# Patient Record
Sex: MALE | Race: WHITE | NOT HISPANIC OR LATINO | Employment: FULL TIME
[De-identification: names, ages, dates, MRNs, and addresses within clinical notes are randomized per-mention and may not be internally consistent; named-entity substitution may affect disease eponyms.]

---

## 2024-02-08 ENCOUNTER — TRANSCRIBE ORDERS (OUTPATIENT)
Dept: OTHER | Age: 64
End: 2024-02-08

## 2024-02-08 DIAGNOSIS — D03.9 MELANOMA IN SITU (H): Primary | ICD-10-CM

## 2024-02-12 ENCOUNTER — TELEPHONE (OUTPATIENT)
Dept: DERMATOLOGY | Facility: CLINIC | Age: 64
End: 2024-02-12
Payer: COMMERCIAL

## 2024-02-12 NOTE — TELEPHONE ENCOUNTER
Attempted to reach patient to schedule a Mohs consult. Left message for patient to return call to clinic to schedule and [email or web address].

## 2024-02-12 NOTE — TELEPHONE ENCOUNTER
----- Message from Tiffany Bailey sent at 2/9/2024  8:28 AM CST -----  Good morning,     This patient has a new referral for Skin, Right Lateral Canthus - MELANOMA IN SITU.     There are no photos and pt doesn't have MyChart. He may be able to set up MyChart and submit photos for a phone visit, but I haven't spoken with him yet.     Is there a time in the near future that we could add Gael on for a consultation in [place]? Let me know what you think.    Thanks,     Tiffany Bailey, Procedure  2/9/2024 8:31 AM

## 2024-02-13 NOTE — TELEPHONE ENCOUNTER
Excision/Mohs previsit information                                                    Diagnosis: melanoma in-situ  Site(s): R lateral canthus     Over the counter Chlorhexidine surgical soap to wash all skin below the belly button twice before surgery should be recommended for the following:  - Surgical sites below the waist  - Immunosuppressed  - Previous surgical site infection  - Anticipated wound care challenges    Medication & Allergy Information                                                      Review and update allergy and medication list.    Do you take the following medications:  Coumadin, Eliquis, Pradaxa, Xarelto:  NO   -If on Coumadin, INR should be checked within 7 days of surgery.  Range should be 3.5 or less or within therapeutic range.    Past Medical History                                                    Do you currently or have you previously had any of the following conditions:    Hepatitis:  NO  HIV/AIDS:  NO  Prolonged bleeding or bleeding disorder:  NO  Pacemaker or Defibrillator:  NO.    History of artificial or heart valve replacement:  NO  Endocarditis (inflammation of the inner lining of the heart's chambers and valves):  NO  Have you ever had a prosthetic joint infection:  NO  Pregnant or Breastfeeding:  N/A  Mobility device (wheelchair, transfer difficulty): NO    Important Reminders:                                                      Ok to take all of their medications as prescribed  Patients can eat, no need to be fasting  Patient will not be able to get the site wet for 48 hrs  No submerging wound in standing water (lake, pool, bathtub, hot tub) for 2 weeks  No physical activity for 48 hrs (further restrictions will be discussed by MD at time of visit)    If any positives, send to RN for further review  Ashia Rivas RN

## 2024-02-13 NOTE — TELEPHONE ENCOUNTER
Fax confirmation received but the records have not come through. I spoke to Gael and he will email us a photo.     Fátima NGUYỄN

## 2024-02-14 ENCOUNTER — VIRTUAL VISIT (OUTPATIENT)
Dept: DERMATOLOGY | Facility: CLINIC | Age: 64
End: 2024-02-14
Payer: COMMERCIAL

## 2024-02-14 DIAGNOSIS — D03.112: Primary | ICD-10-CM

## 2024-02-14 PROCEDURE — 99213 OFFICE O/P EST LOW 20 MIN: CPT | Performed by: DERMATOLOGY

## 2024-02-14 NOTE — NURSING NOTE
Gael Allen's goals for this visit include:   Chief Complaint   Patient presents with    Consult     Mohs consult for MIS right lateral canthus       He requests these members of his care team be copied on today's visit information:     PCP: Jong Alfaro    Referring Provider:  No referring provider defined for this encounter.    There were no vitals taken for this visit.    Do you need any medication refills at today's visit?       Carlie Lawson EMT

## 2024-02-14 NOTE — PROGRESS NOTES
[facility] Dermatology Note  Encounter Date: Feb 14, 2024  Store-and-Forward and Telephone. Location of teledermatologist: [facility].  Start time: 1132. End time: 1148.    Dermatologic Surgery Telemedicine Consult Note    Dermatology Problem List:  Last FBSE 1/29/24 with [facility] provider Dr. Jose Luis Randhawa    1. H/O melanoma  - MIS - right lateral canthus, s/p bx 1/29/24, MMS scheduling pending as of 2/14/24  2. Atypical nevi  - MONITOR: left lower back, left upper back, mid back, right lower back, right upper back; photodocumentation in [facility] records  3. Prurigo nodularis - posterior neck  - Tx: s/p cryo and ILK  - Future Considerations: Dupixent, shave removal  4. AK  - left forehead, s/p cryo 1/29/24  5. H/O benign biopsies  - neurofibroma - left lower back x 1, left flank x 1, mid back x 1, right flank x 1, s/p bx 1/26/23  - impetiginized prurigo nodule 0  mid occipital scalp, s/p bx 6/16/22  6. Digital mucous cyst - 3rd digit DIP right hand  - s/p I&D 6/16/22; referral to hand surgery  7. Facial dermatitis  - Current Tx: ketoconazole cream, pimecrolimus  - Prior Tx: hydrocortisone     PMHx: Inflammatory arthritis.  FHx: Negative for skin cancers or skin conditions.  SHx[place]  ___________________________________________________________    CC: Consult (Mohs consult for MIS right lateral canthus)      Subjective: Gael Allen is a 63 year old male who presents today for Mohs micrographic surgery consultation for a recent diagnosis of skin cancer.  - Skin cancer(s): Melanoma in situ  - Location(s): right lateral canthus  - Patient reports astigmatism of left eye which has not been treatable. Therefore, he is concerned with surgery affecting his the vision of his right eye so that he would be unable to function.   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   - He is scheduled to go on a  "weekend goose hunting trip in 3/2024 and wants to be able to participate in that trip without hindrance. He would like to schedule procedure for after this trip.  - No other concerns today.      Objective:   Skin: Focused examination of the right eye within the teledermatology photograph(s) on 2/14/24 was performed.   - pink atrophic macule on the right lower cutaneous eyelid ~5 mm with faint tan macule adjacent    Path report:   Last derm path 1/29/24  FINAL DIAGNOSIS:  A.  Skin, Right Lateral Canthus, shave:  - MELANOMA IN SITU, extending to the peripheral edges of the specimen  Gross Description:  A: Received in formalin, labeled with the patient's name and \"Skin, Right Lateral Canthus\" is a 5 x 3 x 1 mm shave biopsy of skin.  The specimen is marked with blue ink and submitted entirely in one cassette.  TV    Assessment and Plan:   1. Plan for Mohs micrographic surgery for skin cancer above:  *Review lab result: Dermpath report   - We discussed the nature of the diagnosis/condition above. We discussed the treatment options, including the risks benefits and expectations of these options. We recommend micrographic surgery as the most effective and most tissue sparing option for treatment, and the patient agrees to proceed with this. The patient is aware of the risks, benefits and expectations of this procedure. Assured the patient that his vision should not be affected by the procedure. Provided patient with option to coordinate procedure with eyelid surgeon for repair to be performed the day after micrographic surgery. Patient declined this option due to his schedule. The patient will be scheduled for this procedure, if not already done so. Patient would like to schedule surgery after unavoidable commitment. We discussed that the sooner this is treated, the better. However, melanoma in situ is typically slow growing, therefore surgical efficacy and scarring is likely to be prevalent before or after his commitment. " Ideally scheduled within 6 weeks of biopsy (approximately on 3/11/24).  - We anticipate the following closure type: Sliding or lifting flap    The patient was discussed with and evaluated by attending physician, Ben Lewis DO.    Scribe Disclosure:   I, Fifi Porter, am serving as a scribe to document services personally performed by Ben Lewis MD based on data collection and the provider's statements to me.     Provider Disclosure:   The documentation recorded by the scribe accurately reflects the services I personally performed and the decisions made by me.    Ben Lewis DO    Department of Dermatology  [facility]: Phone: [phone], Fax:[phone]  [facility]: Phone: [phone], Fax: [phone]

## 2024-02-14 NOTE — LETTER
2/14/2024         RE: Gael Allen  [address] [place] [id number]        Dear Colleague,    Thank you for referring your patient, Gael Allen, to the [facility]. Please see a copy of my visit note below.              [facility] Dermatology Note  Encounter Date: Feb 14, 2024  Store-and-Forward and Telephone. Location of teledermatologist: [facility].  Start time: 1132. End time: 1148.    Dermatologic Surgery Telemedicine Consult Note    Dermatology Problem List:  Last FBSE 1/29/24 with [employer] provider Dr. Jose Luis Randhawa    1. H/O melanoma  - MIS - right lateral canthus, s/p bx 1/29/24, MMS scheduling pending as of 2/14/24  2. Atypical nevi  - MONITOR: left lower back, left upper back, mid back, right lower back, right upper back; photodocumentation in [employer] records  3. Prurigo nodularis - posterior neck  - Tx: s/p cryo and ILK  - Future Considerations: Dupixent, shave removal  4. AK  - left forehead, s/p cryo 1/29/24  5. H/O benign biopsies  - neurofibroma - left lower back x 1, left flank x 1, mid back x 1, right flank x 1, s/p bx 1/26/23  - impetiginized prurigo nodule 0  mid occipital scalp, s/p bx 6/16/22  6. Digital mucous cyst - 3rd digit DIP right hand  - s/p I&D 6/16/22; referral to hand surgery  7. Facial dermatitis  - Current Tx: ketoconazole cream, pimecrolimus  - Prior Tx: hydrocortisone     PMHx: Inflammatory arthritis.  FHx: Negative for skin cancers or skin conditions.  SHx: [place].  ___________________________________________________________    CC: Consult (Mohs consult for MIS right lateral canthus)      Subjective: Gael Allen is a 63 year old male who presents today for Mohs micrographic surgery consultation for a recent diagnosis of skin cancer.  - Skin cancer(s): Melanoma in situ  - Location(s): right lateral canthus  - Patient reports astigmatism of left eye which  "has not been treatable. Therefore, he is concerned with surgery affecting his the vision of his right eye so that he would be unable to function.   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   - He is scheduled to go on a weekend goose hunting trip in 3/2024 and wants to be able to participate in that trip without hindrance. He would like to schedule procedure for after this trip.  - No other concerns today.      Objective:   Skin: Focused examination of the right eye within the teledermatology photograph(s) on 2/14/24 was performed.   - pink atrophic macule on the right lower cutaneous eyelid ~5 mm with faint tan macule adjacent    Path report:   Last derm path 1/29/24  FINAL DIAGNOSIS:  A.  Skin, Right Lateral Canthus, shave:  - MELANOMA IN SITU, extending to the peripheral edges of the specimen  Gross Description:  A: Received in formalin, labeled with the patient's name and \"Skin, Right Lateral Canthus\" is a 5 x 3 x 1 mm shave biopsy of skin.  The specimen is marked with blue ink and submitted entirely in one cassette.  TV    Assessment and Plan:   1. Plan for Mohs micrographic surgery for skin cancer above:  *Review lab result: Dermpath report   - We discussed the nature of the diagnosis/condition above. We discussed the treatment options, including the risks benefits and expectations of these options. We recommend micrographic surgery as the most effective and most tissue sparing option for treatment, and the patient agrees to proceed with this. The patient is aware of the risks, benefits and expectations of this procedure. Assured the patient that his vision should not be affected by the procedure. Provided patient with option to coordinate procedure with eyelid surgeon for repair to be performed the day after micrographic surgery. Patient declined this option due to his schedule. The patient will be scheduled for this procedure, if not already done so. Patient would like " to schedule surgery after unavoidable commitment. We discussed that the sooner this is treated, the better. However, melanoma in situ is typically slow growing, therefore surgical efficacy and scarring is likely to be prevalent before or after his commitment. Ideally scheduled within 6 weeks of biopsy (approximately on 3/11/24).  - We anticipate the following closure type: Sliding or lifting flap    The patient was discussed with and evaluated by attending physician, Ben Lewis DO.    Scribe Disclosure:   I, Fifi Porter, am serving as a scribe to document services personally performed by Ben Lewis MD based on data collection and the provider's statements to me.     Provider Disclosure:   The documentation recorded by the scribe accurately reflects the services I personally performed and the decisions made by me.    Ben Lewis DO    Department of Dermatology  [facility]: Phone: [phone], Fax:[phone]  [facility]: Phone: [phone], Fax: [phone]         Again, thank you for allowing me to participate in the care of your patient.        Sincerely,        Ben Lewis MD

## 2024-02-15 ENCOUNTER — TELEPHONE (OUTPATIENT)
Dept: DERMATOLOGY | Facility: CLINIC | Age: 64
End: 2024-02-15
Payer: COMMERCIAL

## 2024-02-15 NOTE — TELEPHONE ENCOUNTER
Called patient to schedule surgery with Dr. Lewis    Date of Surgery: 03/20    Surgery type: mohs    Consult scheduled: Yes    Has patient had mohs with us before? No    Additional comments: pt has a hunting trip in March and needs to wait until after he returns. Pt informed Dr. Lewis of this in the phone visit.       Tiffany Ortizgfried on 2/15/2024 at 12:26 PM

## 2024-03-20 ENCOUNTER — OFFICE VISIT (OUTPATIENT)
Dept: DERMATOLOGY | Facility: CLINIC | Age: 64
End: 2024-03-20
Payer: COMMERCIAL

## 2024-03-20 ENCOUNTER — TELEPHONE (OUTPATIENT)
Dept: DERMATOLOGY | Facility: CLINIC | Age: 64
End: 2024-03-20

## 2024-03-20 VITALS — OXYGEN SATURATION: 98 % | DIASTOLIC BLOOD PRESSURE: 88 MMHG | HEART RATE: 65 BPM | SYSTOLIC BLOOD PRESSURE: 143 MMHG

## 2024-03-20 DIAGNOSIS — D03.112: ICD-10-CM

## 2024-03-20 PROCEDURE — 17311 MOHS 1 STAGE H/N/HF/G: CPT | Mod: GC | Performed by: DERMATOLOGY

## 2024-03-20 PROCEDURE — 88314 HISTOCHEMICAL STAINS ADD-ON: CPT | Mod: 59 | Performed by: DERMATOLOGY

## 2024-03-20 PROCEDURE — 14061 TIS TRNFR E/N/E/L10.1-30SQCM: CPT | Mod: GC | Performed by: DERMATOLOGY

## 2024-03-20 PROCEDURE — 88305 TISSUE EXAM BY PATHOLOGIST: CPT | Mod: XE | Performed by: PATHOLOGY

## 2024-03-20 ASSESSMENT — PAIN SCALES - GENERAL: PAINLEVEL: NO PAIN (0)

## 2024-03-20 NOTE — PATIENT INSTRUCTIONS
Caring for your skin after surgery    After your surgery, a pressure bandage will be placed over the area. This will prevent bleeding. Please follow these instructions over the next 1 to 2 weeks. Following this regimen will help to prevent complications as your wound heals.     For the first 48 hours after your surgery:    Leave the pressure dressing on and keep it dry. If it should come loose, you may re-tape it, but do not take it off.  Relax and take it easy. Do not do any vigorous exercise, heavy lifting or bending forward. This could cause the wound to bleed.  Post-operative pain is usually mild. You may alternate between 1000 mg of Tylenol (acetaminophen) and 400 mg of Ibuprofen every 4 hours.  Do not take more than 4,000 mg of acetaminophen in a 24-hour period or 3200 mg of Ibuprofen in a 24-hr period.  Avoid alcohol and vitamin E as these may increase your tendency to bleed.  You may put an ice pack around the bandaged area for 20 minutes at a time as needed. This may help reduce swelling, bruising, and pain. Make sure the ice pack is waterproof so that the pressure bandage doesn't get wet.  You may see a small amount of drainage or blood on your pressure bandage. This is normal. However:  If drainage or bleeding continues or saturates the bandage, you will need to apply firm pressure over the bandage with a clean washcloth for 15 minutes.  If bleeding continues after applying pressure for 15 minutes, apply an ice pack with gentle pressure to the bandaged area for another 15 minutes.  If bleeding still continues, call our office or go to the nearest emergency room.    48 Hours After Surgery:  Carefully remove the pressure bandage. If it seems sticky or too difficult to get off, you may need to soak it off in the shower.  Wash wound with a mild soap and water.  Use caution when washing the wound, be gentle and do not let the forceful shower stream hit the wound directly.  Pat dry.  Apply Vaseline (from a new  container or tube) over the suture line with a Q-tip.  Cover the site with a bandage.  Do this daily until the sutures have dissolved.      What to expect:    The first couple of days your wound may be tender and may bleed slightly when doing wound care.  There may be swelling and bruising around the wound, especially if it is near the eyes. For your comfort, you may apply ice or cold compresses to the area.  The area around your wound may be numb for several weeks or even months.  You may experience periodic sharp pain or mild itching around the wound as it heals.   The suture line will look dark pink at first and the edges of the wound will be reddened. This will lighten up each day.    Call Us If:    You have bleeding that will not stop after applying pressure and ice.  You have pain that is not controlled with Tylenol and Ibuprofen.  You have signs or symptoms of an infection such as fever over 100 degrees Fahrenheit, redness, warmth or foul-smelling drainage from the wound    [facility]: [phone]   [facility]: [phone]  For urgent needs outside of business hours call the [facility] at [phone] and ask to speak with the dermatology resident on call

## 2024-03-20 NOTE — TELEPHONE ENCOUNTER
[facility] Call Center    Phone Message    May a detailed message be left on voicemail: yes     Reason for Call: Appointment Intake    Referring Provider Name: Dr. Lewis  Diagnosis and/or Symptoms: Reschedule the 4/3/24 Follow-up visit.     Action Taken: Message routed to:  Adult Clinics: Dermatology p [phone]    Travel Screening: Not Applicable

## 2024-03-20 NOTE — PROGRESS NOTES
Gael Allen's chief complaint for this visit includes:  Chief Complaint   Patient presents with    Mohs     MIS right lateral canthus     PCP: Jong Alfaro    Referring Provider:  Josh Randhawa MD  [facility] [place] [address]    BP (!) 143/88   Pulse 65   SpO2 98%   No Pain (0)        Allergies   Allergen Reactions    Augmentin [Amoxicillin-Pot Clavulanate] Rash         Do you need any medication refills at today's visit? No    Oralia Spann, [facility]

## 2024-03-20 NOTE — LETTER
3/20/2024         RE: Gael Allen  [address]  [place] [address]        Dear Colleague,    Thank you for referring your patient, Gael Allen, to the [facility]. Please see a copy of my visit note below.    Gael Allen's chief complaint for this visit includes:  Chief Complaint   Patient presents with     Mohs     MIS right lateral canthus     PCP: Jong Alfaro    Referring Provider:  Josh Randhawa MD  [address] [place] [address]    BP (!) 143/88   Pulse 65   SpO2 98%   No Pain (0)        Allergies   Allergen Reactions     Augmentin [Amoxicillin-Pot Clavulanate] Rash         Do you need any medication refills at today's visit? No    Oralia Spann CMA          [facility] [place] Procedure Note    Dermatology Problem List:  [facility]     1. H/O melanoma  - MIS - right lateral canthus, s/p Mohs 3/20/24  2. Atypical nevi  - MONITOR: left lower back, left upper back, mid back, right lower back, right upper back; photodocumentation in [employer] records  3. Prurigo nodularis - posterior neck  - Tx: s/p cryo and ILK  - Future Considerations: Dupixent, shave removal  4. AK  - left forehead, s/p cryo 1/29/24  5. H/O benign biopsies  - neurofibroma - left lower back x 1, left flank x 1, mid back x 1, right flank x 1, s/p bx 1/26/23  - impetiginized prurigo nodule 0  mid occipital scalp, s/p bx 6/16/22  6. Digital mucous cyst - 3rd digit DIP right hand  - s/p I&D 6/16/22; referral to hand surgery  7. Facial dermatitis  - Current Tx: ketoconazole cream, pimecrolimus  - Prior Tx: hydrocortisone      PMHx: Inflammatory arthritis.  FHx: Negative for skin cancers or skin conditions.  SHx: [place].  ____________________________________________________    Date of Service:  Mar 20, 2024  Surgery: Mohs micrographic surgery    Case 1  Repair Type: Bilobed  transposition flap  Repair Size: 4.0x2.8 cm  Suture Material: Fast Absorbing Gut 5-0  Tumor Type: Melanoma  Location: R lateral canthus  Derm-Path Accession #: [id number]  PreOp Size: 0.5x0.5 cm  PostOp Size: 2.2x1.5 cm  Mohs Accession #: [id number]  Level of Defect: muscle    Procedure:    Stage I  We discussed the principles of treatment and most likely complications including scarring, bleeding, infection, swelling, pain, crusting, nerve damage, large wound,  incomplete excision, wound dehiscence,  nerve damage, recurrence, and a second procedure may be recommended to obtain the best cosmetic or functional result.    Informed consent was obtained and the patient underwent the procedure as follows:  The patient was placed supine on the operating table.  The cancer was identified, outlined with a marker, and verified by the patient.  The entire surgical field was prepped with Providone.  The surgical site was anesthetized using lidocaine with epinephrine.    The area of clinically apparent tumor was excised and sent for permanent sections to rule out invasive melanoma. The peripheral rim of tissue was then surgically excised using a #15 blade and was then transferred onto a specimen sheet maintaining the orientation of the specimen. Hemostasis was obtained using bipolar electrocoagulation. The wound site was then covered with a dressing while the tissue samples were processed for examination.    The excised tissue was transported to the Mohs histology laboratory maintaining the tissue orientation.  The tissue specimen was relaxed so that the entire surgical margin was in a a single horizontal plane for sectioning and inked for precise mapping.  A precise reference map was drawn to reflect the sectioning of the specimen, colored inking of the margins, and orientation on the patient. The tissue was processed using horizontal sectioning of the base and continuous peripheral margins. Vertical, bread loafed sections were  obtained from the central portion of the lesion, prior to being sent for permament sections.     The tissue sections were stained with Hematoxylin and Eosin (H&E), as well as Melanoma antigen recognized by T cells or Melan-A (MART-1) stain. The histopathologic sections were reviewed in conjunction with the reference map.     Total blocks: 2   Total slides:  4    Was the skin lesion clear at this stage?: Yes, the skin lesion was determined clear at periphery at this stage: There was a relatively normal periodicity and density of melanocytes along the dermal-epidermal junction noted at the peripheral margins, therefore Mohs surgery was complete.    Procedure for Bilobed Transposition Flap:    INDICATIONS:  The patient is status post Mohs micrographic surgery.  After consideration of the adjacent tissue type and reconstructive options, including healing by second intention, it was determined that a bilobed transposition flap offered the best chance for preservation of normal anatomic and functional relationships.  The patient was advised of the risks of bleeding, infection, wound dehiscence, pincushioning and discomfort, as well as scar formation.  Informed consent was obtained in writing.  The patient underwent the procedure as follows:    PROCEDURES:  The patient was taken to the operative suite and placed supine on the operating room table.  The wound was identified and infiltrated with Lidocaine 1% with epi 1:100,000;Bupivacaine 0.5%.  The defect was then cleansed and prepped with Providone and draped with sterile drapes.  Using a marker, a bilobed transposition flap repair was planned.  The wound edges were then debeveled and the wound was undermined bluntly in all directions. The bilobed transposition flap was incised sharply to the deep subcutaneous plane.  The flap was undermined from all surrounding tissue. Hemostasis was obtained with bipolar electrocoagulation. The tertiary defect was closed first.  The  first lobe was then transposed into (carried over) the primary defect.  The second lobe was then trimmed to fit and transposed into the defect created by the transposition of the first lobe. The defect and flap were secured using buried Monocryl 4-0 and Monocryl 5-0 dermal sutures.  The epidermis was then carefully approximated using Fast Absorbing Gut 5-0 simple running epidermal sutures throughout the length of the flap.  Redundant skin was excised by the triangulation technique, and closed in similar fashion.  The wound was cleansed with saline and ointment was applied.  A sterile pressure dressing was placed.  The patient left the operating suite in stable condition.  Wound care was reviewed verbally and in writing.      Suture removal: N/A (all dissolving sutures used)      F/U with dermatology surgery in 2 weeks.    Dr. Ben Lewis was present for the entire procedure and always immediately available.    Scribe Disclosure:   I, Adrienne Quinteros, am serving as a scribe; to document services personally performed by Dr. Ben Lewis - -based on data collection and the provider's statements to me.     Staff attestation:  The documentation recorded by the scribe accurately reflects the services I personally performed and the decisions I personally made. I have made edits where needed.    Staff Physician Comments:   I saw and evaluated the patient with the Physician Assistant (LORRAINE Stearns) and I agree with the assessment and plan and the above description of the procedure. I personally performed the key portions of the procedure and entire exam. I was immediately available in the clinic throughout the procedures.     Ben Lewis DO    Department of Dermatology  [facility]: Phone: [phone], Fax:[phone]  [facility]: Phone: [phone], Fax: [phone]    Care and Laboratory  Testing Performed at:  [facility]   Dermatology Clinic  [address] [place] [address]      Again, thank you for allowing me to participate in the care of your patient.        Sincerely,        Ben Lewis MD

## 2024-03-20 NOTE — PROGRESS NOTES
[facility] [place] Procedure Note    Dermatology Problem List:  Last FBSE 1/29/24 with [facility] provider Dr. Jose Luis Randhawa     1. H/O melanoma  - MIS - right lateral canthus, s/p Mohs 3/20/24  2. Atypical nevi  - MONITOR: left lower back, left upper back, mid back, right lower back, right upper back; photodocumentation in [facility] records  3. Prurigo nodularis - posterior neck  - Tx: s/p cryo and ILK  - Future Considerations: Dupixent, shave removal  4. AK  - left forehead, s/p cryo 1/29/24  5. H/O benign biopsies  - neurofibroma - left lower back x 1, left flank x 1, mid back x 1, right flank x 1, s/p bx 1/26/23  - impetiginized prurigo nodule 0  mid occipital scalp, s/p bx 6/16/22  6. Digital mucous cyst - 3rd digit DIP right hand  - s/p I&D 6/16/22; referral to hand surgery  7. Facial dermatitis  - Current Tx: ketoconazole cream, pimecrolimus  - Prior Tx: hydrocortisone      PMHx: Inflammatory arthritis.  FHx: Negative for skin cancers or skin conditions.  SHx: [place].  ____________________________________________________    Date of Service:  Mar 20, 2024  Surgery: Mohs micrographic surgery    Case 1  Repair Type: Bilobed transposition flap  Repair Size: 4.0x2.8 cm  Suture Material: Fast Absorbing Gut 5-0  Tumor Type: Melanoma  Location: R lateral canthus  Derm-Path Accession #: [id number]  PreOp Size: 0.5x0.5 cm  PostOp Size: 2.2x1.5 cm  Mohs Accession #: [id number]  Level of Defect: muscle    Procedure:    Stage I  We discussed the principles of treatment and most likely complications including scarring, bleeding, infection, swelling, pain, crusting, nerve damage, large wound,  incomplete excision, wound dehiscence,  nerve damage, recurrence, and a second procedure may be recommended to obtain the best cosmetic or functional result.    Informed consent was obtained and the patient underwent the procedure as follows:  The patient was placed  supine on the operating table.  The cancer was identified, outlined with a marker, and verified by the patient.  The entire surgical field was prepped with Providone.  The surgical site was anesthetized using lidocaine with epinephrine.    The area of clinically apparent tumor was excised and sent for permanent sections to rule out invasive melanoma. The peripheral rim of tissue was then surgically excised using a #15 blade and was then transferred onto a specimen sheet maintaining the orientation of the specimen. Hemostasis was obtained using bipolar electrocoagulation. The wound site was then covered with a dressing while the tissue samples were processed for examination.    The excised tissue was transported to the Mohs histology laboratory maintaining the tissue orientation.  The tissue specimen was relaxed so that the entire surgical margin was in a a single horizontal plane for sectioning and inked for precise mapping.  A precise reference map was drawn to reflect the sectioning of the specimen, colored inking of the margins, and orientation on the patient. The tissue was processed using horizontal sectioning of the base and continuous peripheral margins. Vertical, bread loafed sections were obtained from the central portion of the lesion, prior to being sent for permament sections.     The tissue sections were stained with Hematoxylin and Eosin (H&E), as well as Melanoma antigen recognized by T cells or Melan-A (MART-1) stain. The histopathologic sections were reviewed in conjunction with the reference map.     Total blocks: 2   Total slides:  4    Was the skin lesion clear at this stage?: Yes, the skin lesion was determined clear at periphery at this stage: There was a relatively normal periodicity and density of melanocytes along the dermal-epidermal junction noted at the peripheral margins, therefore Mohs surgery was complete.    Procedure for Bilobed Transposition Flap:    INDICATIONS:  The patient is  status post Mohs micrographic surgery.  After consideration of the adjacent tissue type and reconstructive options, including healing by second intention, it was determined that a bilobed transposition flap offered the best chance for preservation of normal anatomic and functional relationships.  The patient was advised of the risks of bleeding, infection, wound dehiscence, pincushioning and discomfort, as well as scar formation.  Informed consent was obtained in writing.  The patient underwent the procedure as follows:    PROCEDURES:  The patient was taken to the operative suite and placed supine on the operating room table.  The wound was identified and infiltrated with Lidocaine 1% with epi 1:100,000;Bupivacaine 0.5%.  The defect was then cleansed and prepped with Providone and draped with sterile drapes.  Using a marker, a bilobed transposition flap repair was planned.  The wound edges were then debeveled and the wound was undermined bluntly in all directions. The bilobed transposition flap was incised sharply to the deep subcutaneous plane.  The flap was undermined from all surrounding tissue. Hemostasis was obtained with bipolar electrocoagulation. The tertiary defect was closed first.  The first lobe was then transposed into (carried over) the primary defect.  The second lobe was then trimmed to fit and transposed into the defect created by the transposition of the first lobe. The defect and flap were secured using buried Monocryl 4-0 and Monocryl 5-0 dermal sutures.  The epidermis was then carefully approximated using Fast Absorbing Gut 5-0 simple running epidermal sutures throughout the length of the flap.  Redundant skin was excised by the triangulation technique, and closed in similar fashion.  The wound was cleansed with saline and ointment was applied.  A sterile pressure dressing was placed.  The patient left the operating suite in stable condition.  Wound care was reviewed verbally and in writing.       Suture removal: N/A (all dissolving sutures used)      F/U with dermatology surgery in 2 weeks.    Dr. Ben Lewis was present for the entire procedure and always immediately available.    Scribe Disclosure:   I, Adrienne Quinteros, am serving as a scribe; to document services personally performed by Dr. Ben Lewis - -based on data collection and the provider's statements to me.     Staff attestation:  The documentation recorded by the scribe accurately reflects the services I personally performed and the decisions I personally made. I have made edits where needed.    Staff Physician Comments:   I saw and evaluated the patient with the Physician Assistant (LORRAINE Stearns) and I agree with the assessment and plan and the above description of the procedure. I personally performed the key portions of the procedure and entire exam. I was immediately available in the clinic throughout the procedures.     Ben Lewis DO    Department of Dermatology  [facility]: Phone: [phone], Fax:[phone]  [facility]: Phone: [phone], Fax: [phone]    Care and Laboratory Testing Performed at:  [facility]   Dermatology Clinic  [address] [place] [address]

## 2024-03-22 LAB
PATH REPORT.COMMENTS IMP SPEC: NORMAL
PATH REPORT.COMMENTS IMP SPEC: NORMAL
PATH REPORT.FINAL DX SPEC: NORMAL
PATH REPORT.GROSS SPEC: NORMAL
PATH REPORT.MICROSCOPIC SPEC OTHER STN: NORMAL
PATH REPORT.RELEVANT HX SPEC: NORMAL

## 2024-04-04 ENCOUNTER — OFFICE VISIT (OUTPATIENT)
Dept: DERMATOLOGY | Facility: CLINIC | Age: 64
End: 2024-04-04
Payer: COMMERCIAL

## 2024-04-04 DIAGNOSIS — Z86.006 HISTORY OF MELANOMA IN SITU: ICD-10-CM

## 2024-04-04 DIAGNOSIS — Z51.89 VISIT FOR WOUND CHECK: ICD-10-CM

## 2024-04-04 DIAGNOSIS — L21.9 DERMATITIS, SEBORRHEIC: Primary | ICD-10-CM

## 2024-04-04 PROCEDURE — 99213 OFFICE O/P EST LOW 20 MIN: CPT | Mod: 24 | Performed by: DERMATOLOGY

## 2024-04-04 RX ORDER — DESONIDE 0.5 MG/G
CREAM TOPICAL 2 TIMES DAILY
Qty: 30 G | Refills: 0 | Status: SHIPPED | OUTPATIENT
Start: 2024-04-04

## 2024-04-04 RX ORDER — KETOCONAZOLE 20 MG/ML
SHAMPOO TOPICAL
Qty: 120 ML | Refills: 3 | Status: SHIPPED | OUTPATIENT
Start: 2024-04-04

## 2024-04-04 NOTE — LETTER
4/4/2024         RE: Gael Allen  [address] [facility] [address]        Dear Colleague,    Thank you for referring your patient, Gael Allen, to the [facility]. Please see a copy of my visit note below.    Dermatologic Surgery Post-Op Wound Check     CC: Wound Check (2 week s/p Mohs right lateral canthus. Reports tightness in corner of eye.)    Dermatology Problem List:  Last FBSE 1/29/24 with [facility] provider Dr. Jose Luis Randhawa     1. H/O melanoma  - MIS - right lateral canthus, s/p Mohs 3/20/24  2. Atypical nevi  - MONITOR: left lower back, left upper back, mid back, right lower back, right upper back; photodocumentation in [facility] records  3. Prurigo nodularis - posterior neck  - Tx: s/p cryo and ILK  - Future Considerations: Dupixent, shave removal  4. AK  - left forehead, s/p cryo 1/29/24  5. H/O benign biopsies  - neurofibroma - left lower back x 1, left flank x 1, mid back x 1, right flank x 1, s/p bx 1/26/23  - impetiginized prurigo nodule 0  mid occipital scalp, s/p bx 6/16/22  6. Digital mucous cyst - 3rd digit DIP right hand  - s/p I&D 6/16/22; referral to hand surgery  7. Facial dermatitis  - Current Tx: ketoconazole cream, pimecrolimus  - Prior Tx: hydrocortisone      PMHx: Inflammatory arthritis.  FHx: Negative for skin cancers or skin conditions.  SHx: [place].  ____________________________________________________    Subjective: Gael Allen is a 63 year old male who presents today for wound check after Mohs surgery for melanoma in situ of the right lateral canthus on March 20, 2024.  He denies changes in vision.  He has been applying petroleum jelly as directed.  He discontinued dressings a few days ago.     -He is currently having a flare of the above-mentioned facial dermatitis.  He reports using some lotion at home.  He thinks the rash may be related to materials he is exposed to at work.     - no other concerns  today    Objective: An exam of the face was performed today   - The surgical site noted above is clean, dry, and intact. There is no surrounding erythema, purulence, or significant tenderness to palpation. No clinical evidence of infection noted today.  There is a moderate amount of pincushioning versus edema in the flap area.  The lower lid is in contact with the globe.     Assessment and Plan:     1.  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     - The patient's surgery site is healing well. No evidence of infection on examination today.  - The patient was told to continue with wound cares until the area(s) is/are no longer crusted.   -Continue petroleum jelly for up to 8 weeks after the procedure.  Could consider scar massage if swelling does not resolve after 1 month.   -3-month follow-up scheduled for scar evaluation.     2.  Facial dermatitis  -History suggests irritant or allergic contact dermatitis due to work exposure.  However, inflammation in hairbearing areas of the head may suggest seborrheic dermatitis.   -Will renew ketoconazole in the shampoo form.   -New prescription for desonide use for up to 1 week.     Ben Lewis DO    Department of Dermatology  [facility]: Phone: [phone], Fax:[phone]  [facility]: Phone: [phone], Fax: [phone]                Again, thank you for allowing me to participate in the care of your patient.        Sincerely,        Ben Lewis MD

## 2024-04-04 NOTE — NURSING NOTE
Gael Allen's goals for this visit include:   Chief Complaint   Patient presents with    Wound Check     2 week s/p Mohs right lateral canthus. Reports tightness in corner of eye.       He requests these members of his care team be copied on today's visit information: n/a    PCP: Jong Alfaro    Referring Provider:  No referring provider defined for this encounter.    There were no vitals taken for this visit.    Do you need any medication refills at today's visit? No  Carito Thompson RN

## 2024-04-04 NOTE — PROGRESS NOTES
Dermatologic Surgery Post-Op Wound Check     CC: Wound Check (2 week s/p Mohs right lateral canthus. Reports tightness in corner of eye.)    Dermatology Problem List:  Last FBSE 1/29/24 with [facility] provider Dr. Jose Luis Randhawa     1. H/O melanoma  - MIS - right lateral canthus, s/p Mohs 3/20/24  2. Atypical nevi  - MONITOR: left lower back, left upper back, mid back, right lower back, right upper back; photodocumentation in [facility] records  3. Prurigo nodularis - posterior neck  - Tx: s/p cryo and ILK  - Future Considerations: Dupixent, shave removal  4. AK  - left forehead, s/p cryo 1/29/24  5. H/O benign biopsies  - neurofibroma - left lower back x 1, left flank x 1, mid back x 1, right flank x 1, s/p bx 1/26/23  - impetiginized prurigo nodule 0  mid occipital scalp, s/p bx 6/16/22  6. Digital mucous cyst - 3rd digit DIP right hand  - s/p I&D 6/16/22; referral to hand surgery  7. Facial dermatitis  - Current Tx: ketoconazole cream, pimecrolimus  - Prior Tx: hydrocortisone      PMHx: Inflammatory arthritis.  FHx: Negative for skin cancers or skin conditions.  SHx: [place].  ____________________________________________________    Subjective: Gael Allen is a 63 year old male who presents today for wound check after Mohs surgery for melanoma in situ of the right lateral canthus on March 20, 2024.  He denies changes in vision.  He has been applying petroleum jelly as directed.  He discontinued dressings a few days ago.     -He is currently having a flare of the above-mentioned facial dermatitis.  He reports using some lotion at home.  He thinks the rash may be related to materials he is exposed to at work.     - no other concerns today    Objective: An exam of the face was performed today   - The surgical site noted above is clean, dry, and intact. There is no surrounding erythema, purulence, or significant tenderness to palpation. No clinical evidence of infection noted today.   There is a moderate amount of pincushioning versus edema in the flap area.  The lower lid is in contact with the globe.     Assessment and Plan:     1.  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     - The patient's surgery site is healing well. No evidence of infection on examination today.  - The patient was told to continue with wound cares until the area(s) is/are no longer crusted.   -Continue petroleum jelly for up to 8 weeks after the procedure.  Could consider scar massage if swelling does not resolve after 1 month.   -3-month follow-up scheduled for scar evaluation.     2.  Facial dermatitis  -History suggests irritant or allergic contact dermatitis due to work exposure.  However, inflammation in hairbearing areas of the head may suggest seborrheic dermatitis.   -Will renew ketoconazole in the shampoo form.   -New prescription for desonide use for up to 1 week.     Ben Lewis DO    Department of Dermatology  [facility]: Phone: [phone], Fax:[phone]  [facility]: Phone: [phone], Fax: [phone]

## 2024-07-11 ENCOUNTER — OFFICE VISIT (OUTPATIENT)
Dept: DERMATOLOGY | Facility: CLINIC | Age: 64
End: 2024-07-11
Payer: COMMERCIAL

## 2024-07-11 DIAGNOSIS — Z51.89 VISIT FOR WOUND CHECK: ICD-10-CM

## 2024-07-11 DIAGNOSIS — Z86.006 HISTORY OF MELANOMA IN SITU: Primary | ICD-10-CM

## 2024-07-11 PROCEDURE — 99212 OFFICE O/P EST SF 10 MIN: CPT | Performed by: DERMATOLOGY

## 2024-07-11 NOTE — PROGRESS NOTES
Dermatologic Surgery Post-Op Scar Check     CC: Scar Management (3 month scar eval - right lateral canthus, patient states that it is healing up great nothing is tender )      Dermatology Problem List:  Last FBSE 1/29/24 with [facility] provider Dr. Jose Luis Randhawa     1. H/O melanoma  - MIS - right lateral canthus, s/p Mohs 3/20/24  2. Atypical nevi  - MONITOR: left lower back, left upper back, mid back, right lower back, right upper back; photodocumentation in [facility] records  3. Prurigo nodularis - posterior neck  - Tx: s/p cryo and ILK  - Future Considerations: Dupixent, shave removal  4. AK  - left forehead, s/p cryo 1/29/24  5. H/O benign biopsies  - neurofibroma - left lower back x 1, left flank x 1, mid back x 1, right flank x 1, s/p bx 1/26/23  - impetiginized prurigo nodule 0  mid occipital scalp, s/p bx 6/16/22  6. Digital mucous cyst - 3rd digit DIP right hand  - s/p I&D 6/16/22; referral to hand surgery  7. Facial dermatitis  - Current Tx: ketoconazole cream, pimecrolimus  - Prior Tx: hydrocortisone      PMHx: Inflammatory arthritis.  FHx: Negative for skin cancers or skin conditions.  SHx: [place]  ___________________________________________________________________    Subjective: Gael Allen is a 63 year old male who presents today for scar evaluation after Mohs surgery for melanoma in situ of the right lateral canthus on March 20, 2024.    - ports wound is healing well  - denies any dryness or excessive watering of his right eye  -  continues to have flaring of facial dermatitis   - no other concerns today    Objective: An exam of the face was performed today   - well-healed scar on the right lateral canthus   - geometric surgical scar with consistent with bilobed transposition flap   - lower lid remains in good contact with the globe    Assessment and Plan:     1. MIS - right lateral canthus, s/p Mohs 3/20/24   - Surgical site healed well.  - The patient should follow  up with dermatologic surgery PRN, as well as continue with regular skin exams in general dermatology clinic.  - Continue sun avoidance and sun protection.     Patient was discussed with and evaluated by attending physician Dr. Lewis.    Staff Involved:   Scribe/Resident/Staff     Scribe Disclosure:   I, ALEXSANDRA NAJERA, am serving as a scribe; to document services personally performed by Ben Lewis MD -based on data collection and the provider's statements to me.      Provider Disclosure:   The documentation recorded by the scribe accurately reflects the services I personally performed and the decisions made by me.    Ben Lewis DO    Department of Dermatology  [facility]: Phone: [phone], Fax:[phone]  [facility]: Phone: [phone], Fax: [phone]      CC: Dr. Josh Evans, [facility] [place], [place]

## 2024-07-11 NOTE — NURSING NOTE
Gael Allen's goals for this visit include:   Chief Complaint   Patient presents with    Scar Management     3 month scar eval - right lateral canthus, patient states that it is healing up great nothing is tender        He requests these members of his care team be copied on today's visit information:     PCP: Jong Alfaro    Referring Provider:  Referred Self, MD  No address on file    There were no vitals taken for this visit.    Do you need any medication refills at today's visit?       Carlie Lawson EMT

## 2024-07-11 NOTE — LETTER
7/11/2024      Gael Allen  [address] [facility] [place] [id number]      Dear Colleague,    Thank you for referring your patient, Gael Allen, to the [facility]. Please see a copy of my visit note below.    Dermatologic Surgery Post-Op Scar Check     CC: Scar Management (3 month scar eval - right lateral canthus, patient states that it is healing up great nothing is tender )      Dermatology Problem List:  Last FBSE 1/29/24 with [facility] provider Dr. Jose Luis Randhawa     1. H/O melanoma  - MIS - right lateral canthus, s/p Mohs 3/20/24  2. Atypical nevi  - MONITOR: left lower back, left upper back, mid back, right lower back, right upper back; photodocumentation in [facility] records  3. Prurigo nodularis - posterior neck  - Tx: s/p cryo and ILK  - Future Considerations: Dupixent, shave removal  4. AK  - left forehead, s/p cryo 1/29/24  5. H/O benign biopsies  - neurofibroma - left lower back x 1, left flank x 1, mid back x 1, right flank x 1, s/p bx 1/26/23  - impetiginized prurigo nodule 0  mid occipital scalp, s/p bx 6/16/22  6. Digital mucous cyst - 3rd digit DIP right hand  - s/p I&D 6/16/22; referral to hand surgery  7. Facial dermatitis  - Current Tx: ketoconazole cream, pimecrolimus  - Prior Tx: hydrocortisone      PMHx: Inflammatory arthritis.  FHx: Negative for skin cancers or skin conditions.  SHx: [place].  ___________________________________________________________________    Subjective: Gael Allen is a 63 year old male who presents today for scar evaluation after Mohs surgery for melanoma in situ of the right lateral canthus on March 20, 2024.    - ports wound is healing well  - denies any dryness or excessive watering of his right eye  -  continues to have flaring of facial dermatitis   - no other concerns today    Objective: An exam of the face was performed today   - well-healed scar on the right lateral canthus   - geometric surgical  scar with consistent with bilobed transposition flap   - lower lid remains in good contact with the globe    Assessment and Plan:     1. MIS - right lateral canthus, s/p Mohs 3/20/24   - Surgical site healed well.  - The patient should follow up with dermatologic surgery PRN, as well as continue with regular skin exams in general dermatology clinic.  - Continue sun avoidance and sun protection.     Patient was discussed with and evaluated by attending physician Dr. Lewis.    Staff Involved:   Scribe/Resident/Staff     Scribe Disclosure:   I, ALEXSANDRA NAJERA, am serving as a scribe; to document services personally performed by Ben Lewis MD -based on data collection and the provider's statements to me.      Provider Disclosure:   The documentation recorded by the scribe accurately reflects the services I personally performed and the decisions made by me.    Ben Lewis DO    Department of Dermatology  [facility]: Phone: [phone], Fax:[phone]  [facility]: Phone: [phone], Fax: [phone]      CC: Dr. Josh Evans, [facility] [place], [place]      Again, thank you for allowing me to participate in the care of your patient.        Sincerely,        Ben Lweis MD